# Patient Record
Sex: MALE | Race: WHITE
[De-identification: names, ages, dates, MRNs, and addresses within clinical notes are randomized per-mention and may not be internally consistent; named-entity substitution may affect disease eponyms.]

---

## 2018-11-19 ENCOUNTER — HOSPITAL ENCOUNTER (EMERGENCY)
Dept: HOSPITAL 62 - ER | Age: 33
Discharge: HOME | End: 2018-11-19
Payer: COMMERCIAL

## 2018-11-19 VITALS — SYSTOLIC BLOOD PRESSURE: 132 MMHG | DIASTOLIC BLOOD PRESSURE: 66 MMHG

## 2018-11-19 DIAGNOSIS — R10.31: ICD-10-CM

## 2018-11-19 DIAGNOSIS — N20.1: Primary | ICD-10-CM

## 2018-11-19 DIAGNOSIS — R31.9: ICD-10-CM

## 2018-11-19 DIAGNOSIS — R10.30: ICD-10-CM

## 2018-11-19 LAB
ADD MANUAL DIFF: NO
ALBUMIN SERPL-MCNC: 4.4 G/DL (ref 3.5–5)
ALP SERPL-CCNC: 51 U/L (ref 38–126)
ALT SERPL-CCNC: 72 U/L (ref 21–72)
ANION GAP SERPL CALC-SCNC: 14 MMOL/L (ref 5–19)
APPEARANCE UR: CLEAR
APTT PPP: YELLOW S
AST SERPL-CCNC: 55 U/L (ref 17–59)
BASOPHILS # BLD AUTO: 0.1 10^3/UL (ref 0–0.2)
BASOPHILS NFR BLD AUTO: 1 % (ref 0–2)
BILIRUB DIRECT SERPL-MCNC: 0.3 MG/DL (ref 0–0.4)
BILIRUB SERPL-MCNC: 0.8 MG/DL (ref 0.2–1.3)
BILIRUB UR QL STRIP: NEGATIVE
BUN SERPL-MCNC: 15 MG/DL (ref 7–20)
CALCIUM: 9.3 MG/DL (ref 8.4–10.2)
CHLORIDE SERPL-SCNC: 104 MMOL/L (ref 98–107)
CO2 SERPL-SCNC: 24 MMOL/L (ref 22–30)
EOSINOPHIL # BLD AUTO: 0 10^3/UL (ref 0–0.6)
EOSINOPHIL NFR BLD AUTO: 0.5 % (ref 0–6)
ERYTHROCYTE [DISTWIDTH] IN BLOOD BY AUTOMATED COUNT: 13.1 % (ref 11.5–14)
GLUCOSE SERPL-MCNC: 108 MG/DL (ref 75–110)
GLUCOSE UR STRIP-MCNC: NEGATIVE MG/DL
HCT VFR BLD CALC: 45.8 % (ref 37.9–51)
HGB BLD-MCNC: 15.8 G/DL (ref 13.5–17)
KETONES UR STRIP-MCNC: NEGATIVE MG/DL
LYMPHOCYTES # BLD AUTO: 2.8 10^3/UL (ref 0.5–4.7)
LYMPHOCYTES NFR BLD AUTO: 31.2 % (ref 13–45)
MCH RBC QN AUTO: 29.8 PG (ref 27–33.4)
MCHC RBC AUTO-ENTMCNC: 34.5 G/DL (ref 32–36)
MCV RBC AUTO: 86 FL (ref 80–97)
MONOCYTES # BLD AUTO: 0.6 10^3/UL (ref 0.1–1.4)
MONOCYTES NFR BLD AUTO: 7.1 % (ref 3–13)
NEUTROPHILS # BLD AUTO: 5.4 10^3/UL (ref 1.7–8.2)
NEUTS SEG NFR BLD AUTO: 60.2 % (ref 42–78)
NITRITE UR QL STRIP: NEGATIVE
PH UR STRIP: 5 [PH] (ref 5–9)
PLATELET # BLD: 268 10^3/UL (ref 150–450)
POTASSIUM SERPL-SCNC: 4.3 MMOL/L (ref 3.6–5)
PROT SERPL-MCNC: 7.3 G/DL (ref 6.3–8.2)
PROT UR STRIP-MCNC: NEGATIVE MG/DL
RBC # BLD AUTO: 5.3 10^6/UL (ref 4.35–5.55)
SODIUM SERPL-SCNC: 142.2 MMOL/L (ref 137–145)
SP GR UR STRIP: 1.02
TOTAL CELLS COUNTED % (AUTO): 100 %
UROBILINOGEN UR-MCNC: NEGATIVE MG/DL (ref ?–2)
WBC # BLD AUTO: 8.9 10^3/UL (ref 4–10.5)

## 2018-11-19 PROCEDURE — 81001 URINALYSIS AUTO W/SCOPE: CPT

## 2018-11-19 PROCEDURE — 96361 HYDRATE IV INFUSION ADD-ON: CPT

## 2018-11-19 PROCEDURE — 80053 COMPREHEN METABOLIC PANEL: CPT

## 2018-11-19 PROCEDURE — 85025 COMPLETE CBC W/AUTO DIFF WBC: CPT

## 2018-11-19 PROCEDURE — 36415 COLL VENOUS BLD VENIPUNCTURE: CPT

## 2018-11-19 PROCEDURE — 96374 THER/PROPH/DIAG INJ IV PUSH: CPT

## 2018-11-19 PROCEDURE — 96375 TX/PRO/DX INJ NEW DRUG ADDON: CPT

## 2018-11-19 PROCEDURE — 76380 CAT SCAN FOLLOW-UP STUDY: CPT

## 2018-11-19 PROCEDURE — 99284 EMERGENCY DEPT VISIT MOD MDM: CPT

## 2018-11-19 NOTE — ER DOCUMENT REPORT
ED General





- General


Chief Complaint: Flank Pain


Stated Complaint: FLANK PAIN


Time Seen by Provider: 11/19/18 11:56


Notes: 





Patient is a 33-year-old male with history of kidney stones that presents to 

the emergency department for chief complaint of right flank pain.  Patient 

reports that this pain started this morning, describes it as a 10 out of 10, it 

has eased up some, is mainly in the right flank, with some radiation towards 

the groin, pain described as a sharp stabbing sensation, that is constant, 

denies having any hematuria or dysuria.  Denies having any fevers, chills, 

chest pain, shortness of breath, cough.  But does admit to having some nausea 

without vomiting.  He states this is very similar to his prior kidney stones 

that has had in the past.





Past Medical History: Kidney stones


Past Surgical History: Denies surgical history


Social History: Denies tobacco, alcohol or illicit drug use.


Family History: Reviewed and noncontributory for presenting illness


Allergies: Reviewed, see documented allergy list. 





REVIEW OF SYSTEMS:


Other than noted above, the 12 point review of systems was reviewed with the 

patient and were negative, all pertinent findings are included in the HPI.





PHYSICAL EXAMINATION:





Vital signs reviewed, nursing noted reviewed. 





GENERAL: Well-appearing, well-nourished and in no acute distress.





HEAD: Atraumatic, normocephalic.





EYES: Eyes appear normal, extraocular movements intact, sclera anicteric, 

conjunctiva are normal.





ENT: nares patent, oropharynx clear without exudates.  Moist mucous membranes.





NECK: Normal range of motion, supple without lymphadenopathy





LUNGS: Breath sounds clear to auscultation bilaterally and equal.  No wheezes 

rales or rhonchi.





HEART: Regular rate and rhythm without murmurs





ABDOMEN: Soft, right CVA tenderness with palpation, normoactive bowel sounds.  

No rebound, guarding, or rigidity. No masses appreciated.





EXTREMITIES: Nontender, good range of motion, no pitting or edema.  





NEUROLOGICAL: No focal neurological deficits. Moves all extremities 

spontaneously Motor and sensory grossly intact on exam.





PSYCH: Normal mood, normal affect.





SKIN: Warm, Dry, normal turgor, no rashes or lesions noted on exposed skin





TRAVEL OUTSIDE OF THE [place] IN LAST 30 DAYS: No





- Related Data


Allergies/Adverse Reactions: 


 





No Known Allergies Allergy (Verified 11/19/18 11:42)


 











Past Medical History





- Social History


Smoking Status: Never Smoker


Chew tobacco use (# tins/day): No


Frequency of alcohol use: None


Drug Abuse: None


Family History: Reviewed & Not Pertinent


Patient has suicidal ideation: No


Patient has homicidal ideation: No


Renal/ Medical History: Denies: Hx Peritoneal Dialysis





- Immunizations


Immunizations up to date: Yes


Hx Diphtheria, Pertussis, Tetanus Vaccination: Yes





Physical Exam





- Vital signs


Vitals: 


 











Temp Pulse Resp BP Pulse Ox


 


 98.1 F   62   16   141/67 H  98 


 


 11/19/18 11:53  11/19/18 11:53  11/19/18 11:53  11/19/18 11:53  11/19/18 11:53














Course





- Re-evaluation


Re-evalutation: 





Patient seen and examined vital signs reviewed. 





Laboratory data and imaging were ordered as appropriate for the patient's 

presenting symptoms and complaint, with consideration of any critical or life 

threatening conditions that may be associated with their obtained history and 

exam as noted above.





Patient was treated with IV fluids, IV morphine, IV Zofran, and IV Toradol





Results were reviewed when available and demonstrated unremarkable blood work, 

CT imaging demonstrated a small ureteral stone causing obstruction, at the UVJ, 

UA is positive for hematuria, but no evidence of urinary tract infection





The patient was re-evaluated and was still having some pain and discomfort, he 

was given a dose of IV Dilaudid 1 mg





Evaluation was most consistent with small ureteral stone, expect this patient 

to have spontaneous passage of the stone, will be prescribed Flomax, Percocet 

for breakthrough pain, naproxen, and Zofran for nausea, advised to follow-up 

with urology, given a list of urologist in the area to follow-up with.





Results were discussed with the patient at this point, after careful 

consideration I feel that that patient can be discharged from the emergency 

department, the patient was educated treatments and reasons to return to the 

emergency department based on their presumed diagnosis as noted above, they 

were advised to followup with a primary care physician in 2-3 days. Patient was 

agreeable to plan of care.





*Note is created using voice recognition software and may contain spelling, 

syntax or grammatical errors.








Laboratory











  11/19/18 11/19/18 11/19/18





  11:52 12:13 12:13


 


WBC   8.9 


 


RBC   5.30 


 


Hgb   15.8 


 


Hct   45.8 


 


MCV   86 


 


MCH   29.8 


 


MCHC   34.5 


 


RDW   13.1 


 


Plt Count   268 


 


Seg Neutrophils %   60.2 


 


Lymphocytes %   31.2 


 


Monocytes %   7.1 


 


Eosinophils %   0.5 


 


Basophils %   1.0 


 


Absolute Neutrophils   5.4 


 


Absolute Lymphocytes   2.8 


 


Absolute Monocytes   0.6 


 


Absolute Eosinophils   0.0 


 


Absolute Basophils   0.1 


 


Sodium    Cancelled


 


Potassium    Cancelled


 


Chloride    Cancelled


 


Carbon Dioxide    Cancelled


 


Anion Gap    Cancelled


 


BUN    Cancelled


 


Creatinine    Cancelled


 


Est GFR ( Amer)    Cancelled


 


Est GFR (Non-Af Amer)    Cancelled


 


Glucose    Cancelled


 


Calcium    Cancelled


 


Total Bilirubin    Cancelled


 


Direct Bilirubin    Cancelled


 


Neonat Total Bilirubin    Cancelled


 


Neonat Direct Bilirubin    Cancelled


 


Neonat Indirect Bili    Cancelled


 


AST    Cancelled


 


ALT    Cancelled


 


Alkaline Phosphatase    Cancelled


 


Total Protein    Cancelled


 


Albumin    Cancelled


 


Urine Color  YELLOW  


 


Urine Appearance  CLEAR  


 


Urine pH  5.0  


 


Ur Specific Gravity  1.025  


 


Urine Protein  NEGATIVE  


 


Urine Glucose (UA)  NEGATIVE  


 


Urine Ketones  NEGATIVE  


 


Urine Blood  LARGE H  


 


Urine Nitrite  NEGATIVE  


 


Urine Bilirubin  NEGATIVE  


 


Urine Urobilinogen  NEGATIVE  


 


Ur Leukocyte Esterase  NEGATIVE  


 


Urine WBC (Auto)  4  


 


Urine RBC (Auto)  >182  


 


Urine Mucus (Auto)  RARE  


 


Urine Ascorbic Acid  NEGATIVE  














  11/19/18





  13:35


 


WBC 


 


RBC 


 


Hgb 


 


Hct 


 


MCV 


 


MCH 


 


MCHC 


 


RDW 


 


Plt Count 


 


Seg Neutrophils % 


 


Lymphocytes % 


 


Monocytes % 


 


Eosinophils % 


 


Basophils % 


 


Absolute Neutrophils 


 


Absolute Lymphocytes 


 


Absolute Monocytes 


 


Absolute Eosinophils 


 


Absolute Basophils 


 


Sodium  142.2


 


Potassium  4.3


 


Chloride  104


 


Carbon Dioxide  24


 


Anion Gap  14


 


BUN  15


 


Creatinine  1.17


 


Est GFR ( Amer)  > 60


 


Est GFR (Non-Af Amer)  > 60


 


Glucose  108


 


Calcium  9.3


 


Total Bilirubin  0.8


 


Direct Bilirubin  0.3


 


Neonat Total Bilirubin  Not Reportable


 


Neonat Direct Bilirubin  Not Reportable


 


Neonat Indirect Bili  Not Reportable


 


AST  55


 


ALT  72


 


Alkaline Phosphatase  51


 


Total Protein  7.3


 


Albumin  4.4


 


Urine Color 


 


Urine Appearance 


 


Urine pH 


 


Ur Specific Gravity 


 


Urine Protein 


 


Urine Glucose (UA) 


 


Urine Ketones 


 


Urine Blood 


 


Urine Nitrite 


 


Urine Bilirubin 


 


Urine Urobilinogen 


 


Ur Leukocyte Esterase 


 


Urine WBC (Auto) 


 


Urine RBC (Auto) 


 


Urine Mucus (Auto) 


 


Urine Ascorbic Acid 




















 





Limited or Localized CT  11/19/18 12:05


IMPRESSION:  Obstructing 1 a 2 mm in diameter calculus at the level of the 

uterovesical junction on the right.  No renal calculi are identified.  Other 

findings as noted above


 























- Vital Signs


Vital signs: 


 











Temp Pulse Resp BP Pulse Ox


 


 98.6 F   77   16   132/66 H  100 


 


 11/19/18 14:03  11/19/18 14:03  11/19/18 14:03  11/19/18 14:03  11/19/18 14:03














- Laboratory


Result Diagrams: 


 11/19/18 12:13





 11/19/18 13:35


Laboratory results interpreted by me: 


 











  11/19/18





  11:52


 


Urine Blood  LARGE H














Discharge





- Discharge


Clinical Impression: 


 Ureteral stone, Flank pain





Hematuria


Qualifiers:


 Hematuria type: unspecified type Qualified Code(s): R31.9 - Hematuria, 

unspecified





Condition: Stable


Disposition: HOME, SELF-CARE


Instructions:  Kidney Stone ([facility])


Additional Instructions: 


Take all medications as prescribed








[facility] 


[email or web address] 


[address] [facility] 


[phone] 





[facility] 


[email or web address] 


[address] [place] [address] [place] [address] [place] 


[phone] 





[facility] 


[email or web address] 


[address] [facility] 


[phone] 


Prescriptions: 


Naproxen 500 mg PO BID PRN #30 tablet.


 PRN Reason: flank pain


Ondansetron [Zofran Odt 4 mg Tablet] 1 tab PO Q8H PRN #15 tab.rapdis


 PRN Reason: For Nausea/Vomiting


Oxycodone HCl/Acetaminophen [Percocet 5-325 mg Tablet] 1 tab PO Q8H PRN #15 tab


 PRN Reason: flank pain


Tamsulosin HCl [Flomax 0.4 mg Cap.sr] 0.4 mg PO DAILY #7 cap.sr.24h


Referrals: 


GIO SIERRA MD [[facility]AFF] - Follow up as needed (or your 

primary care. )

## 2018-11-19 NOTE — RADIOLOGY REPORT (SQ)
EXAM DESCRIPTION:  CT LTD RENAL STONE PROTOCOL ON



COMPLETED DATE/TIME:  11/19/2018 12:25 pm



REASON FOR STUDY:  right flank pain, hx kidney stones



COMPARISON:  December 2016



TECHNIQUE:  CT scan of the abdomen and pelvis performed without intravenous or oral contrast. Images 
reviewed with lung, soft tissue, and bone windows. Reconstructed coronal and sagittal MPR images revi
ewed. All images stored on PACS.

All CT scanners at this facility use dose modulation, iterative reconstruction, and/or weight based d
osing when appropriate to reduce radiation dose to as low as reasonably achievable (ALARA).

[facility]: Dose Right  [facility]: CareDose    [facility]: Dose Right    [facility]: Teradose 4D    [facility]: Smart ZeroNines Technology



RADIATION DOSE:  CT Rad equipment meets quality standard of care and radiation dose reduction techniq
ues were employed. CTDIvol: 22.6 mGy. DLP: 1410 mGy-cm.mGy.



LIMITATIONS:  None.



FINDINGS:  LOWER CHEST: No significant findings. No nodules or infiltrates.

NON-CONTRASTED LIVER, SPLEEN, ADRENALS: Evaluation limited by lack of IV contrast. No identified sign
ificant masses.  Again there is fatty infiltration of the liver.

PANCREAS: No masses. No peripancreatic inflammatory changes.

GALLBLADDER: No identified stones by CT criteria. No inflammatory changes to suggest cholecystitis.

RIGHT KIDNEY AND URETER: No suspicious masses. Assessment limited by lack of IV contrast.   No defini
te renal calculi are identified.  There is a tiny 1 a 2 mm in diameter obstructing calculus at the le
estephania of the uterovesical junction.   There is hydronephrosis of the right kidney and dilatation of the
 right ureter proximal to the level of the obstructing calculus.

LEFT KIDNEY AND URETER: No suspicious masses. Assessment limited by lack of IV contrast.   No signifi
cant calcifications.   No hydronephrosis or hydroureter.

AORTA AND RETROPERITONEUM: No aneurysm. No retroperitoneal masses or adenopathy.

BOWEL AND PERITONEAL CAVITY: No obvious masses or inflammatory changes. No free fluid.

APPENDIX: Normal.

PELVIS, BLADDER, AND ABDOMINAL WALL:No abnormal masses. No free fluid. Bladder normal.

BONES: No significant findings.

OTHER: No other significant finding.



IMPRESSION:  Obstructing 1 a 2 mm in diameter calculus at the level of the uterovesical junction on t
he right.  No renal calculi are identified.  Other findings as noted above



COMMENT:  Quality ID # [id number]: Final reports with documentation of one or more dose reduction techniques
 (e.g., Automated exposure control, adjustment of the mA and/or kV according to patient size, use of 
iterative reconstruction technique)



TECHNICAL DOCUMENTATION:  JOB ID:  [id number]

 2011 [employer]- All Rights Reserved



Reading location - IP/workstation name: [email or web address]